# Patient Record
Sex: FEMALE | Race: OTHER | URBAN - METROPOLITAN AREA
[De-identification: names, ages, dates, MRNs, and addresses within clinical notes are randomized per-mention and may not be internally consistent; named-entity substitution may affect disease eponyms.]

---

## 2019-04-03 ENCOUNTER — IMPORTED ENCOUNTER (OUTPATIENT)
Dept: URBAN - METROPOLITAN AREA CLINIC 50 | Facility: CLINIC | Age: 69
End: 2019-04-03

## 2019-08-19 ENCOUNTER — IMPORTED ENCOUNTER (OUTPATIENT)
Dept: URBAN - METROPOLITAN AREA CLINIC 50 | Facility: CLINIC | Age: 69
End: 2019-08-19

## 2020-10-27 ENCOUNTER — IMPORTED ENCOUNTER (OUTPATIENT)
Dept: URBAN - METROPOLITAN AREA CLINIC 50 | Facility: CLINIC | Age: 70
End: 2020-10-27

## 2021-04-23 ASSESSMENT — VISUAL ACUITY
OS_OTHER: 20/70. 20/80.
OS_SC: 20/20-1
OD_CC: J1+
OD_OTHER: 20/60. 20/80.
OD_BAT: 20/60
OS_CC: J1+
OD_SC: 20/20-1
OD_CC: J1+@ 12 IN
OS_SC: 20/20-
OS_CC: J1+@ 12 IN
OS_BAT: 20/70
OD_SC: 20/20-1

## 2021-04-23 ASSESSMENT — TONOMETRY
OS_IOP_MMHG: 18
OS_IOP_MMHG: 14
OD_IOP_MMHG: 15
OD_IOP_MMHG: 18

## 2022-01-26 ENCOUNTER — PREPPED CHART (OUTPATIENT)
Dept: URBAN - METROPOLITAN AREA CLINIC 53 | Facility: CLINIC | Age: 72
End: 2022-01-26

## 2022-02-01 ENCOUNTER — COMPREHENSIVE EXAM (OUTPATIENT)
Dept: URBAN - METROPOLITAN AREA CLINIC 53 | Facility: CLINIC | Age: 72
End: 2022-02-01

## 2022-02-01 DIAGNOSIS — H40.031: ICD-10-CM

## 2022-02-01 DIAGNOSIS — H40.032: ICD-10-CM

## 2022-02-01 PROCEDURE — 92012 INTRM OPH EXAM EST PATIENT: CPT

## 2022-02-01 PROCEDURE — 66761 REVISION OF IRIS: CPT

## 2022-02-01 PROCEDURE — 92015 DETERMINE REFRACTIVE STATE: CPT

## 2022-02-01 ASSESSMENT — TONOMETRY
OS_IOP_MMHG: 17
OD_IOP_MMHG: 19
OS_IOP_MMHG: 19
OD_IOP_MMHG: 18
OS_IOP_MMHG: 18

## 2022-02-01 ASSESSMENT — VISUAL ACUITY
OS_PH: 20/20-1
OD_SC: 20/25-2
OD_SC: J7-2
OD_GLARE: 20/40
OU_SC: 20/25
OU_SC: J7
OS_SC: 20/30-1
OS_SC: J7
OS_GLARE: 20/40
OD_GLARE: 20/25-2
OS_GLARE: 20/30

## 2022-02-01 ASSESSMENT — KERATOMETRY
OD_K2POWER_DIOPTERS: 44.00
OS_AXISANGLE_DEGREES: 090
OD_AXISANGLE_DEGREES: 129
OS_K1POWER_DIOPTERS: 43.50
OS_K2POWER_DIOPTERS: 43.75
OS_AXISANGLE2_DEGREES: 180
OD_K1POWER_DIOPTERS: 43.25
OD_AXISANGLE2_DEGREES: 39

## 2022-02-01 ASSESSMENT — PACHYMETRY
OD_CT_UM: 526
OS_CT_UM: 536

## 2022-02-01 NOTE — PROCEDURE NOTE: CLINICAL
PROCEDURE NOTE: YAG Peripheral Iridotomy OS. Diagnosis: Anatomic Narrow Angles. Prior to treatment, the risks/benefits/alternatives were discussed. After consent was obtained, the patient was placed in the minor laser surgical room and placed in from of the YAG laser. A series of laser spots were placed to iris to create a patient iridotomy. Pulse energy = 1.2mj. Total pulses = 56. Total energy: * mJ. Patient tolerated procedure well. Post laser instructions given. Patient will call immediately for decreased vision, eye pain, redness, or any other concerns. Post-procedure IOP = * mmHg. Marti Chiang

## 2022-02-01 NOTE — PATIENT DISCUSSION
IOP slightly elevated OU but within normal limits. Narrow angles OU. Recommend Yag PI OS today then OD in 1-4 week. Start Flarex OS BID x 3 days then discontinue.

## 2022-02-01 NOTE — PATIENT DISCUSSION
IOP slightly elevated OU but within normal limits. Narrow angles OU. Recommend Yag PI OS today then OD in 1-4 week.

## 2022-02-15 ENCOUNTER — CLINIC PROCEDURE ONLY (OUTPATIENT)
Dept: URBAN - METROPOLITAN AREA CLINIC 53 | Facility: CLINIC | Age: 72
End: 2022-02-15

## 2022-02-15 DIAGNOSIS — H40.031: ICD-10-CM

## 2022-02-15 PROCEDURE — 66761 REVISION OF IRIS: CPT

## 2022-02-15 ASSESSMENT — VISUAL ACUITY
OU_SC: 20/25
OS_SC: 20/25-1
OD_SC: 20/25-1

## 2022-02-15 ASSESSMENT — TONOMETRY
OD_IOP_MMHG: 14
OD_IOP_MMHG: 15
OD_IOP_MMHG: 14
OS_IOP_MMHG: 15
OS_IOP_MMHG: 16

## 2022-02-15 NOTE — PROCEDURE NOTE: CLINICAL
PROCEDURE NOTE: YAG Peripheral Iridotomy OD. Prior to treatment, the risks/benefits/alternatives were discussed. After consent was obtained, the patient was placed in the minor laser surgical room and placed in from of the YAG laser. A series of laser spots were placed to iris to create a patient iridotomy. Pulse energy = 1.6mj. Total pulses = 06. Total energy: * mJ. Patient tolerated procedure well. Post laser instructions given. Patient will call immediately for decreased vision, eye pain, redness, or any other concerns. Post-procedure IOP = * mmHg. Gerri Pérez

## 2022-04-19 ENCOUNTER — FOLLOW UP (OUTPATIENT)
Dept: URBAN - METROPOLITAN AREA CLINIC 53 | Facility: CLINIC | Age: 72
End: 2022-04-19

## 2022-04-19 DIAGNOSIS — H40.033: ICD-10-CM

## 2022-04-19 PROCEDURE — 92012 INTRM OPH EXAM EST PATIENT: CPT

## 2022-04-19 ASSESSMENT — VISUAL ACUITY
OS_SC: 20/25-1
OD_SC: 20/25-1
OU_SC: 20/20-1

## 2022-04-19 ASSESSMENT — TONOMETRY
OD_IOP_MMHG: 15
OD_IOP_MMHG: 14
OS_IOP_MMHG: 15
OS_IOP_MMHG: 16

## 2023-08-29 ENCOUNTER — COMPREHENSIVE EXAM (OUTPATIENT)
Dept: URBAN - METROPOLITAN AREA CLINIC 53 | Facility: CLINIC | Age: 73
End: 2023-08-29

## 2023-08-29 DIAGNOSIS — H40.033: ICD-10-CM

## 2023-08-29 DIAGNOSIS — H02.831: ICD-10-CM

## 2023-08-29 DIAGNOSIS — Z79.4: ICD-10-CM

## 2023-08-29 DIAGNOSIS — H25.13: ICD-10-CM

## 2023-08-29 DIAGNOSIS — E11.9: ICD-10-CM

## 2023-08-29 DIAGNOSIS — H02.834: ICD-10-CM

## 2023-08-29 DIAGNOSIS — H35.363: ICD-10-CM

## 2023-08-29 DIAGNOSIS — H43.813: ICD-10-CM

## 2023-08-29 PROCEDURE — 99214 OFFICE O/P EST MOD 30 MIN: CPT

## 2023-08-29 PROCEDURE — 92134 CPTRZ OPH DX IMG PST SGM RTA: CPT

## 2023-08-29 ASSESSMENT — TONOMETRY
OS_IOP_MMHG: 17
OS_IOP_MMHG: 16
OD_IOP_MMHG: 16
OD_IOP_MMHG: 15

## 2023-08-29 ASSESSMENT — VISUAL ACUITY
OS_GLARE: 20/20
OU_CC: J1+@14"
OS_GLARE: 20/20
OD_SC: 20/20
OD_GLARE: 20/20
OS_SC: 20/20
OD_GLARE: 20/20

## 2024-09-03 ENCOUNTER — COMPREHENSIVE EXAM (OUTPATIENT)
Dept: URBAN - METROPOLITAN AREA CLINIC 53 | Facility: CLINIC | Age: 74
End: 2024-09-03

## 2024-09-03 DIAGNOSIS — Z79.4: ICD-10-CM

## 2024-09-03 DIAGNOSIS — H35.363: ICD-10-CM

## 2024-09-03 DIAGNOSIS — H43.813: ICD-10-CM

## 2024-09-03 DIAGNOSIS — E11.9: ICD-10-CM

## 2024-09-03 DIAGNOSIS — H52.4: ICD-10-CM

## 2024-09-03 DIAGNOSIS — H40.033: ICD-10-CM

## 2024-09-03 DIAGNOSIS — H25.13: ICD-10-CM

## 2024-09-03 PROCEDURE — 92015 DETERMINE REFRACTIVE STATE: CPT

## 2024-09-03 PROCEDURE — 99214 OFFICE O/P EST MOD 30 MIN: CPT

## 2024-09-03 PROCEDURE — 92134 CPTRZ OPH DX IMG PST SGM RTA: CPT

## 2024-11-19 NOTE — PATIENT DISCUSSION
"""Call if vision decreases or RD warning signs increases/RD warnings given. No signs of retinal tear. Retinal detachment precautions discussed.  """
"""Dermatochalasis RUL bothersome to patient.  Recommend ptosis visual field and photos at ""
"""Dermatochalasis SOFIYA bothersome to patient.  Recommend ptosis visual field and photos at ""
"""Informed patient that their cataract(s) are not visually significant or do not meet the criteria for cataract surgery.  Recommended attention to common cataract symptoms
"""Type 2 Diabetic eye exam with dilation. No diabetic retinopathy found. Recommend annual dilated examinations. Patient instructed to call office immediately if sudden changes in vision occur. Emphasized importance of good blood glucose control. Summary of care provided to the physician managing the ongoing diabetes care. """
[facility]

## 2025-05-13 ENCOUNTER — FOLLOW UP (OUTPATIENT)
Age: 75
End: 2025-05-13

## 2025-05-13 DIAGNOSIS — H10.9: ICD-10-CM

## 2025-05-13 PROCEDURE — 99213 OFFICE O/P EST LOW 20 MIN: CPT

## 2025-08-26 ENCOUNTER — COMPREHENSIVE EXAM (OUTPATIENT)
Age: 75
End: 2025-08-26

## 2025-08-26 DIAGNOSIS — H40.033: ICD-10-CM

## 2025-08-26 DIAGNOSIS — Z79.4: ICD-10-CM

## 2025-08-26 DIAGNOSIS — H25.13: ICD-10-CM

## 2025-08-26 DIAGNOSIS — H35.363: ICD-10-CM

## 2025-08-26 DIAGNOSIS — H43.813: ICD-10-CM

## 2025-08-26 DIAGNOSIS — E11.9: ICD-10-CM

## 2025-08-26 PROCEDURE — 92134 CPTRZ OPH DX IMG PST SGM RTA: CPT

## 2025-08-26 PROCEDURE — 99214 OFFICE O/P EST MOD 30 MIN: CPT
